# Patient Record
Sex: FEMALE | Race: WHITE | NOT HISPANIC OR LATINO | Employment: FULL TIME | URBAN - METROPOLITAN AREA
[De-identification: names, ages, dates, MRNs, and addresses within clinical notes are randomized per-mention and may not be internally consistent; named-entity substitution may affect disease eponyms.]

---

## 2020-01-30 ENCOUNTER — CLINICAL SUPPORT (OUTPATIENT)
Dept: URGENT CARE | Facility: CLINIC | Age: 40
End: 2020-01-30

## 2020-01-30 PROCEDURE — 86580 TB INTRADERMAL TEST: CPT | Mod: S$GLB,,, | Performed by: EMERGENCY MEDICINE

## 2020-01-30 PROCEDURE — 86580 PR  TB INTRADERMAL TEST: ICD-10-PCS | Mod: S$GLB,,, | Performed by: EMERGENCY MEDICINE

## 2020-12-09 ENCOUNTER — TELEPHONE (OUTPATIENT)
Dept: FAMILY MEDICINE | Facility: CLINIC | Age: 40
End: 2020-12-09

## 2020-12-09 DIAGNOSIS — Z72.89 OTHER PROBLEMS RELATED TO LIFESTYLE: ICD-10-CM

## 2020-12-09 DIAGNOSIS — Z13.9 SCREENING FOR UNSPECIFIED CONDITION: Primary | ICD-10-CM

## 2020-12-09 DIAGNOSIS — Z11.59 NEED FOR HEPATITIS C SCREENING TEST: ICD-10-CM

## 2025-04-10 ENCOUNTER — HOSPITAL ENCOUNTER (EMERGENCY)
Facility: HOSPITAL | Age: 45
Discharge: LEFT BEFORE DISCHARGE | End: 2025-04-10
Attending: EMERGENCY MEDICINE

## 2025-04-10 VITALS
DIASTOLIC BLOOD PRESSURE: 88 MMHG | BODY MASS INDEX: 31.83 KG/M2 | RESPIRATION RATE: 18 BRPM | SYSTOLIC BLOOD PRESSURE: 129 MMHG | HEIGHT: 68 IN | WEIGHT: 210 LBS | TEMPERATURE: 99 F | OXYGEN SATURATION: 98 % | HEART RATE: 85 BPM

## 2025-04-10 DIAGNOSIS — R10.9 ACUTE LEFT FLANK PAIN: ICD-10-CM

## 2025-04-10 DIAGNOSIS — R07.9 CHEST PAIN: ICD-10-CM

## 2025-04-10 DIAGNOSIS — N30.01 ACUTE CYSTITIS WITH HEMATURIA: Primary | ICD-10-CM

## 2025-04-10 LAB
ABSOLUTE EOSINOPHIL (SMH): 0.07 K/UL
ABSOLUTE MONOCYTE (SMH): 0.76 K/UL (ref 0.3–1)
ABSOLUTE NEUTROPHIL COUNT (SMH): 6.2 K/UL (ref 1.8–7.7)
ALBUMIN SERPL-MCNC: 4 G/DL (ref 3.5–5.2)
ALP SERPL-CCNC: 67 UNIT/L (ref 55–135)
ALT SERPL-CCNC: 18 UNIT/L (ref 10–44)
ANION GAP (SMH): 6 MMOL/L (ref 8–16)
AST SERPL-CCNC: 13 UNIT/L (ref 10–40)
B-HCG UR QL: NEGATIVE
BACTERIA #/AREA URNS AUTO: ABNORMAL /HPF
BASOPHILS # BLD AUTO: 0.02 K/UL
BASOPHILS NFR BLD AUTO: 0.2 %
BILIRUB SERPL-MCNC: 0.5 MG/DL (ref 0.1–1)
BILIRUB UR QL STRIP.AUTO: NEGATIVE
BNP SERPL-MCNC: 5 PG/ML
BUN SERPL-MCNC: 16 MG/DL (ref 6–20)
CALCIUM SERPL-MCNC: 9 MG/DL (ref 8.7–10.5)
CHLORIDE SERPL-SCNC: 110 MMOL/L (ref 95–110)
CLARITY UR: ABNORMAL
CO2 SERPL-SCNC: 22 MMOL/L (ref 23–29)
COLOR UR AUTO: YELLOW
CREAT SERPL-MCNC: 1.1 MG/DL (ref 0.5–1.4)
CTP QC/QA: YES
D DIMER PPP IA.FEU-MCNC: 0.37 MG/L FEU
ERYTHROCYTE [DISTWIDTH] IN BLOOD BY AUTOMATED COUNT: 13.1 % (ref 11.5–14.5)
GFR SERPLBLD CREATININE-BSD FMLA CKD-EPI: >60 ML/MIN/1.73/M2
GLUCOSE SERPL-MCNC: 106 MG/DL (ref 70–110)
GLUCOSE UR QL STRIP: NEGATIVE
HCT VFR BLD AUTO: 42.8 % (ref 37–48.5)
HCV AB SERPL QL IA: NORMAL
HGB BLD-MCNC: 15 GM/DL (ref 12–16)
HGB UR QL STRIP: NEGATIVE
HIV 1+2 AB+HIV1 P24 AG SERPL QL IA: NORMAL
IMM GRANULOCYTES # BLD AUTO: 0.08 K/UL (ref 0–0.04)
IMM GRANULOCYTES NFR BLD AUTO: 0.8 % (ref 0–0.5)
KETONES UR QL STRIP: NEGATIVE
LEUKOCYTE ESTERASE UR QL STRIP: ABNORMAL
LYMPHOCYTES # BLD AUTO: 2.89 K/UL (ref 1–4.8)
MAGNESIUM SERPL-MCNC: 1.9 MG/DL (ref 1.6–2.6)
MCH RBC QN AUTO: 32.5 PG (ref 27–31)
MCHC RBC AUTO-ENTMCNC: 35 G/DL (ref 32–36)
MCV RBC AUTO: 93 FL (ref 82–98)
MICROSCOPIC COMMENT: ABNORMAL
NITRITE UR QL STRIP: POSITIVE
NUCLEATED RBC (/100WBC) (SMH): 0 /100 WBC
PH UR STRIP: 7 [PH]
PLATELET # BLD AUTO: 258 K/UL (ref 150–450)
PMV BLD AUTO: 9.8 FL (ref 9.2–12.9)
POTASSIUM SERPL-SCNC: 3.8 MMOL/L (ref 3.5–5.1)
PROT SERPL-MCNC: 6.4 GM/DL (ref 6–8.4)
PROT UR QL STRIP: NEGATIVE
RBC # BLD AUTO: 4.62 M/UL (ref 4–5.4)
RBC #/AREA URNS AUTO: <1 /HPF
RELATIVE EOSINOPHIL (SMH): 0.7 % (ref 0–8)
RELATIVE LYMPHOCYTE (SMH): 28.9 % (ref 18–48)
RELATIVE MONOCYTE (SMH): 7.6 % (ref 4–15)
RELATIVE NEUTROPHIL (SMH): 61.8 % (ref 38–73)
SODIUM SERPL-SCNC: 138 MMOL/L (ref 136–145)
SP GR UR STRIP: 1.02
SQUAMOUS #/AREA URNS AUTO: 4 /HPF
TROPONIN HIGH SENSITIVE (SMH): <2.3 PG/ML
TROPONIN HIGH SENSITIVE (SMH): <2.3 PG/ML
UROBILINOGEN UR STRIP-ACNC: NEGATIVE EU/DL
WBC # BLD AUTO: 10 K/UL (ref 3.9–12.7)
WBC #/AREA URNS AUTO: 11 /HPF

## 2025-04-10 PROCEDURE — 93010 ELECTROCARDIOGRAM REPORT: CPT | Mod: ,,, | Performed by: GENERAL PRACTICE

## 2025-04-10 PROCEDURE — 96375 TX/PRO/DX INJ NEW DRUG ADDON: CPT

## 2025-04-10 PROCEDURE — 83735 ASSAY OF MAGNESIUM: CPT | Performed by: NURSE PRACTITIONER

## 2025-04-10 PROCEDURE — 99285 EMERGENCY DEPT VISIT HI MDM: CPT | Mod: 25

## 2025-04-10 PROCEDURE — 96374 THER/PROPH/DIAG INJ IV PUSH: CPT

## 2025-04-10 PROCEDURE — 81025 URINE PREGNANCY TEST: CPT | Performed by: NURSE PRACTITIONER

## 2025-04-10 PROCEDURE — 84484 ASSAY OF TROPONIN QUANT: CPT | Performed by: NURSE PRACTITIONER

## 2025-04-10 PROCEDURE — 81003 URINALYSIS AUTO W/O SCOPE: CPT | Performed by: NURSE PRACTITIONER

## 2025-04-10 PROCEDURE — 87389 HIV-1 AG W/HIV-1&-2 AB AG IA: CPT | Performed by: EMERGENCY MEDICINE

## 2025-04-10 PROCEDURE — 63600175 PHARM REV CODE 636 W HCPCS: Performed by: EMERGENCY MEDICINE

## 2025-04-10 PROCEDURE — 85379 FIBRIN DEGRADATION QUANT: CPT | Performed by: EMERGENCY MEDICINE

## 2025-04-10 PROCEDURE — 96361 HYDRATE IV INFUSION ADD-ON: CPT

## 2025-04-10 PROCEDURE — 85025 COMPLETE CBC W/AUTO DIFF WBC: CPT | Performed by: NURSE PRACTITIONER

## 2025-04-10 PROCEDURE — 83880 ASSAY OF NATRIURETIC PEPTIDE: CPT | Performed by: NURSE PRACTITIONER

## 2025-04-10 PROCEDURE — 87088 URINE BACTERIA CULTURE: CPT | Performed by: NURSE PRACTITIONER

## 2025-04-10 PROCEDURE — 86803 HEPATITIS C AB TEST: CPT | Performed by: EMERGENCY MEDICINE

## 2025-04-10 PROCEDURE — 93005 ELECTROCARDIOGRAM TRACING: CPT | Performed by: GENERAL PRACTICE

## 2025-04-10 PROCEDURE — 25000003 PHARM REV CODE 250: Performed by: EMERGENCY MEDICINE

## 2025-04-10 PROCEDURE — 84155 ASSAY OF PROTEIN SERUM: CPT | Performed by: NURSE PRACTITIONER

## 2025-04-10 RX ORDER — CEFTRIAXONE 1 G/1
1 INJECTION, POWDER, FOR SOLUTION INTRAMUSCULAR; INTRAVENOUS ONCE
Status: COMPLETED | OUTPATIENT
Start: 2025-04-10 | End: 2025-04-10

## 2025-04-10 RX ORDER — KETOROLAC TROMETHAMINE 30 MG/ML
15 INJECTION, SOLUTION INTRAMUSCULAR; INTRAVENOUS
Status: COMPLETED | OUTPATIENT
Start: 2025-04-10 | End: 2025-04-10

## 2025-04-10 RX ORDER — BUSPIRONE HYDROCHLORIDE 30 MG/1
30 TABLET ORAL 2 TIMES DAILY
COMMUNITY
Start: 2025-03-26

## 2025-04-10 RX ORDER — ROSUVASTATIN CALCIUM 20 MG/1
20 TABLET, COATED ORAL DAILY
COMMUNITY

## 2025-04-10 RX ORDER — SULFAMETHOXAZOLE AND TRIMETHOPRIM 800; 160 MG/1; MG/1
1 TABLET ORAL 2 TIMES DAILY
Qty: 14 TABLET | Refills: 0 | Status: SHIPPED | OUTPATIENT
Start: 2025-04-10 | End: 2025-04-17

## 2025-04-10 RX ORDER — SODIUM CHLORIDE 9 MG/ML
1000 INJECTION, SOLUTION INTRAVENOUS
Status: COMPLETED | OUTPATIENT
Start: 2025-04-10 | End: 2025-04-10

## 2025-04-10 RX ORDER — TOPIRAMATE 25 MG/1
25 TABLET ORAL DAILY
COMMUNITY

## 2025-04-10 RX ORDER — DEXTROAMPHETAMINE SACCHARATE, AMPHETAMINE ASPARTATE MONOHYDRATE, DEXTROAMPHETAMINE SULFATE AND AMPHETAMINE SULFATE 6.25; 6.25; 6.25; 6.25 MG/1; MG/1; MG/1; MG/1
25 CAPSULE, EXTENDED RELEASE ORAL EVERY MORNING
COMMUNITY
Start: 2025-03-26 | End: 2025-04-25

## 2025-04-10 RX ORDER — DEXTROAMPHETAMINE SACCHARATE, AMPHETAMINE ASPARTATE MONOHYDRATE, DEXTROAMPHETAMINE SULFATE AND AMPHETAMINE SULFATE 6.25; 6.25; 6.25; 6.25 MG/1; MG/1; MG/1; MG/1
25 CAPSULE, EXTENDED RELEASE ORAL EVERY MORNING
COMMUNITY
Start: 2025-05-22 | End: 2025-06-21

## 2025-04-10 RX ORDER — BUPROPION HYDROCHLORIDE 300 MG/1
300 TABLET ORAL DAILY
COMMUNITY

## 2025-04-10 RX ADMIN — CEFTRIAXONE 1 G: 1 INJECTION, POWDER, FOR SOLUTION INTRAMUSCULAR; INTRAVENOUS at 04:04

## 2025-04-10 RX ADMIN — KETOROLAC TROMETHAMINE 15 MG: 30 INJECTION, SOLUTION INTRAMUSCULAR; INTRAVENOUS at 04:04

## 2025-04-10 RX ADMIN — SODIUM CHLORIDE 1000 ML: 9 INJECTION, SOLUTION INTRAVENOUS at 04:04

## 2025-04-10 NOTE — ED PROVIDER NOTES
Encounter Date: 4/10/2025       History     Chief Complaint   Patient presents with    Chest Pain     States she began at 3 am she began with chest pressure and feels like something is sitting in her chest.  States she is passing several kidney stones.  States over the last 1 1/2 weeks she is waking up to both hands up to her elbows have been numb.       Ms. Cheng reports pain in L side and L flank. States she feels like she's passing a kidney stone. That is where most of her pain is at this time. She also reports waking up at 3am with chest pain that feels like pressure and burning, states the pain has persisted throughout the day. She also is concerned about several weeks of tingling and numbness in both arms from the elbows down that takes time to wake up with moving and shaking out and massaging her hands. Denies sob, sweating, neck pain, back pain. Reports mild intermittent headaches. Has mild dysuria. Is caring for father who is currently admitted to hospital. Denies vomiting or syncope.         Review of patient's allergies indicates:   Allergen Reactions    Opioids - morphine analogues      Past Medical History:   Diagnosis Date    Asthma     Bell's palsy     Kidney stone      Past Surgical History:   Procedure Laterality Date    APPENDECTOMY      TUBAL LIGATION       No family history on file.  Social History[1]  Review of Systems   Respiratory:  Positive for chest tightness. Negative for cough and shortness of breath.    Cardiovascular:  Positive for chest pain. Negative for palpitations and leg swelling.   Gastrointestinal: Negative.    Genitourinary:  Positive for dysuria and flank pain.        Flank pain on L   Neurological:         Tingling and numbness in both arms intermittently that wakes her up or noticed in am, from elbows down. Denies weakness or other numbness or tingling.    All other systems reviewed and are negative.      Physical Exam     Initial Vitals [04/10/25 1304]   BP Pulse Resp Temp  SpO2   138/84 103 19 98.5 °F (36.9 °C) 99 %      MAP       --         Physical Exam    Nursing note and vitals reviewed.  Constitutional: She appears well-developed and well-nourished. She is not diaphoretic. No distress.   HENT:   Head: Normocephalic and atraumatic.   Neck:   Normal range of motion.  Cardiovascular:  Normal rate, regular rhythm and normal heart sounds.           No murmur heard.  Pulmonary/Chest: Breath sounds normal. No respiratory distress. She has no wheezes. She has no rales.   Abdominal: Abdomen is soft. She exhibits no distension.   Pt examined in CC area so skin not inspected for her privacy.    Musculoskeletal:         General: No tenderness or edema. Normal range of motion.      Cervical back: Normal range of motion.     Neurological: She is alert and oriented to person, place, and time. GCS score is 15. GCS eye subscore is 4. GCS verbal subscore is 5. GCS motor subscore is 6.   Skin: Skin is warm. No rash noted.   Psychiatric: She has a normal mood and affect. Thought content normal.         ED Course   Procedures  Labs Reviewed   CULTURE, URINE - Abnormal       Result Value    Urine Culture   (*)     Value: >100,000 cfu/ml Gram-negative arnaldo - nonfermenter species   COMPREHENSIVE METABOLIC PANEL - Abnormal    Sodium 138      Potassium 3.8      Chloride 110      CO2 22 (*)     Glucose 106      BUN 16      Creatinine 1.1      Calcium 9.0      Protein Total 6.4      Albumin 4.0      Bilirubin Total 0.5      ALP 67      AST 13      ALT 18      Anion Gap 6 (*)     eGFR >60     URINALYSIS, REFLEX TO URINE CULTURE - Abnormal    Color, UA Yellow      Appearance, UA Hazy (*)     Spec Grav UA 1.020      pH, UA 7.0      Protein, UA Negative      Glucose, UA Negative      Ketones, UA Negative      Blood, UA Negative      Bilirubin, UA Negative      Urobilinogen, UA Negative      Nitrites, UA Positive (*)     Leukocyte Esterase, UA 1+ (*)    CBC WITH DIFFERENTIAL - Abnormal    WBC 10.00      RBC 4.62       Hgb 15.0      Hct 42.8      MCV 93      MCH 32.5 (*)     MCHC 35.0      RDW 13.1      Platelet Count 258      MPV 9.8      Nucleated RBC 0      Neut % 61.8      Lymph % 28.9      Mono % 7.6      Eos % 0.7      Basophil % 0.2      Imm Grans % 0.8 (*)     Neut # 6.2      Lymph # 2.89      Mono # 0.76      Eos # 0.07      Baso # 0.02      Imm Grans # 0.08 (*)    URINALYSIS MICROSCOPIC - Abnormal    RBC, UA <1      WBC, UA 11 (*)     Bacteria, UA Moderate (*)     Squamous Epithelial Cells, UA 4      Microscopic Comment       HEPATITIS C ANTIBODY - Normal    Hep C Ab Interp Non-Reactive     HIV 1 / 2 ANTIBODY - Normal    HIV 1/2 Ag/Ab Non-Reactive     MAGNESIUM - Normal    Magnesium 1.9     TROPONIN I HIGH SENSITIVITY - Normal    Troponin High Sensitive <2.3     TROPONIN I HIGH SENSITIVITY - Normal    Troponin High Sensitive <2.3     B-TYPE NATRIURETIC PEPTIDE - Normal    BNP 5     D DIMER, QUANTITATIVE - Normal    D-Dimer 0.37     CBC W/ AUTO DIFFERENTIAL    Narrative:     The following orders were created for panel order CBC auto differential.  Procedure                               Abnormality         Status                     ---------                               -----------         ------                     CBC with Differential[[id number]]       Abnormal            Final result                 Please view results for these tests on the individual orders.   POCT URINE PREGNANCY    POC Preg Test, Ur Negative       Acceptable Yes       EKG Readings: (Independently Interpreted)   Initial Reading: No STEMI. Rhythm: Sinus Tachycardia. Ectopy: No Ectopy. Conduction: Normal.   No prior for comparison     ECG Results              EKG 12-lead (In process)        Collection Time Result Time QRS Duration OHS QTC Calculation    04/10/25 12:58:56 04/10/25 13:20:29 82 456                     In process by Interface, Lab In [facility] (04/10/25 13:20:33)                   Narrative:    Test Reason :  R07.9,    Vent. Rate : 102 BPM     Atrial Rate : 102 BPM     P-R Int : 162 ms          QRS Dur :  82 ms      QT Int : 350 ms       P-R-T Axes :  68  75  65 degrees    QTcB Int : 456 ms    Sinus tachycardia  Otherwise normal ECG  No previous ECGs available    Referred By: AAAREFERRAL SELF           Confirmed By:                                   Imaging Results              CT Abdomen Pelvis  Without Contrast (Final result)  Result time 04/10/25 19:40:37      Final result by Anthony Avelar MD (04/10/25 19:40:37)                   Impression:      No acute intra-abdominal process.    Mild atelectasis left upper lobe.      Electronically signed by: Anthony Avelar  Date:    04/10/2025  Time:    19:40               Narrative:    EXAMINATION:  CT ABDOMEN PELVIS WITHOUT CONTRAST    CLINICAL HISTORY:  Flank pain, kidney stone suspected;L flank and LLQ, nitrite pos urine;    TECHNIQUE:  Low dose axial images, sagittal and coronal reformations were obtained from the lung bases to the pubic symphysis.  Oral contrast was not administered.    COMPARISON:  10/11/2015    FINDINGS:  Soft tissues: Unremarkable.    Bones: No acute osseous abnormality.    Lower chest: Normal heart size. No pericardial effusion.    Lung Bases: Mild atelectasis left upper lobe.    Liver: Normal in size and attenuation, with no focal hepatic lesions.    Gallbladder: No calcified gallstones.    Bile Ducts: No evidence of dilated ducts.    Pancreas: No mass or peripancreatic fat stranding.    Spleen: Unremarkable.    Adrenals: Unremarkable.    Kidneys/ Ureters: Nonobstructive bilateral nephrolithiasis.  Left renal scarring.    Bladder: No evidence of wall thickening.    Reproductive organs: Fibroids.    GI Tract/Mesentery: No evidence of bowel obstruction or inflammation.    Peritoneal Space: No ascites. No free air.    Lymphadenopathy: No significant adenopathy.    Vasculature: No significant atherosclerosis or aneurysm.                                        X-Ray Chest PA And Lateral (Final result)  Result time 04/10/25 14:40:28      Final result by Cecilia Sorenson MD (04/10/25 14:40:28)                   Impression:      Normal chest.      Electronically signed by: Cecilia Sorenson  Date:    04/10/2025  Time:    14:40               Narrative:    EXAMINATION:  XR CHEST PA AND LATERAL    CLINICAL HISTORY:  Chest Pain;    FINDINGS:  PA and lateral chest without comparisons shows normal cardiomediastinal silhouette.    Lungs are clear. Pulmonary vasculature is normal. No acute osseous abnormality.                                       Medications   0.9% NaCl infusion (0 mLs Intravenous Stopped 4/10/25 3227)   cefTRIAXone injection 1 g (1 g Intravenous Given 4/10/25 1640)   ketorolac injection 15 mg (15 mg Intravenous Given 4/10/25 1640)     Medical Decision Making  43 yo with several complaints including chest pain, dysuria, flank pain, tingling/numbness in arms for several weeks.   Ddx is broad and includes but is not limited to cardiac, urologic, neurologic, metabolic, electrolyte, infectious, inflammatory.   Pt given iv fluids. UA noted, uti, rocephin given.   Ddimer negative. Labs noted, cbc, cmp, trop reassuring.   CT abd/pelvis ordered due to reported hx of renal stones and sxs of active stones per pt.     I was called to RWR space, pt reports she wants to leave, states she can't wait for results. Family present, pt doesn't want to stay any longer. I specifically advised that work up isn't complete and cT isn't resulted, she would need to leave against medical advise and she understands risks. Will give rx for uti to continue care of one known issue, etiology of other complaints not fully elucidated before pt left ED.     Amount and/or Complexity of Data Reviewed  Radiology: ordered.    Risk  Prescription drug management.      Additional MDM:   Heart Score:    History:          Slightly suspicious.  ECG:             Normal  Age:               Less  than 45 years  Risk factors: >= 3 risk factors or history of atherosclerotic disease  Troponin:       Less than or equal to normal limit  Heart Score = 2                                       Clinical Impression:  Final diagnoses:  [R07.9] Chest pain  [N30.01] Acute cystitis with hematuria (Primary)  [R10.9] Acute left flank pain          ED Disposition Condition    AMA Stable                    [1]   Social History  Tobacco Use    Smoking status: Every Day     Current packs/day: 0.50     Types: Cigarettes   Substance Use Topics    Alcohol use: Yes     Comment: occ    Drug use: No        Rere Hough MD  04/11/25 [id number]

## 2025-04-10 NOTE — FIRST PROVIDER EVALUATION
Emergency Department TeleTriage Encounter Note      CHIEF COMPLAINT    Chief Complaint   Patient presents with    Chest Pain     States she began at 3 am she began with chest pressure and feels like something is sitting in her chest.  States she is passing several kidney stones.  States over the last 1 1/2 weeks she is waking up to both hands up to her elbows have been numb.         VITAL SIGNS   Initial Vitals [04/10/25 1304]   BP Pulse Resp Temp SpO2   138/84 103 19 98.5 °F (36.9 °C) 99 %      MAP       --            ALLERGIES    Review of patient's allergies indicates:   Allergen Reactions    Opioids - morphine analogues        PROVIDER TRIAGE NOTE  Verbal consent for the teletriage evaluation was given by the patient at the start of the evaluation.  All efforts will be made to maintain patient's privacy during the evaluation.      This is a teletriage evaluation of a 44 y.o. female presenting to the ED with c/o CP and lower abd pain that started last night.  Also thinks passing kidney stones. Limited physical exam via telehealth: The patient is awake, alert, answering questions appropriately and is not in respiratory distress.  As the Teletriage provider, I performed an initial assessment and ordered appropriate labs and imaging studies, if any, to facilitate the patient's care once placed in the ED. Once a room is available, care and a full evaluation will be completed by an alternate ED provider.  Any additional orders and the final disposition will be determined by that provider.  All imaging and labs will not be followed-up by the Teletriage Team, including myself.          ORDERS  Labs Reviewed   HEPATITIS C ANTIBODY   HIV 1 / 2 ANTIBODY       ED Orders (720h ago, onward)      Start Ordered     Status Ordering Provider    04/10/25 1544 04/10/25 1343  Troponin I High Sensitivity #2  Once Timed         Ordered KEYSHAWN AKERS    04/10/25 1344 04/10/25 1343  Magnesium  STAT         Ordered KEYSHAWN AKERS     04/10/25 1344 04/10/25 1343  Vital signs  Every 15 min         Ordered KEYSHAWN AKERS    04/10/25 1344 04/10/25 1343  Cardiac Monitoring - Adult  Continuous        Comments: Notify Physician If:    Ordered KEYSHAWN AKERS    04/10/25 1344 04/10/25 1343  Pulse Oximetry Continuous  Continuous         Ordered KEYSHAWN AKERS    04/10/25 1344 04/10/25 1343  Diet NPO  Diet effective now         Ordered KEYSHAWN AKERS    04/10/25 1344 04/10/25 1343  Saline lock IV  Once         Ordered KEYSHAWN AKERS    04/10/25 1344 04/10/25 1343  EKG 12-lead  Once        Comments: Do not perform if previously done during this visit/ triage    Ordered KEYSHAWN AKERS    04/10/25 1344 04/10/25 1343  CBC auto differential  STAT         Ordered KEYSHAWN AKERS    04/10/25 1344 04/10/25 1343  Comprehensive metabolic panel  STAT         Ordered KEYSHAWN AKERS    04/10/25 1344 04/10/25 1343  Troponin I High Sensitivity #1  STAT         Ordered KEYSHAWN AKERS    04/10/25 1344 04/10/25 1343  B-Type natriuretic peptide (BNP)  STAT         Ordered KEYSHAWN AKERS    04/10/25 1344 04/10/25 1343  X-Ray Chest PA And Lateral  1 time imaging         Ordered KEYSHAWN AKERS    04/10/25 1344 04/10/25 1343  POCT urine pregnancy  Once         Ordered KEYSHAWN AKERS    04/10/25 1344 04/10/25 1343  Urinalysis, Reflex to Urine Culture  STAT         Ordered KEYSHAWN AKERS    04/10/25 1307 04/10/25 1307  Hepatitis C Antibody  STAT         Ordered MARLEN NIELSEN    04/10/25 1307 04/10/25 1307  HIV 1/2 Ag/Ab (4th Gen)  STAT         Ordered MARLEN NIELSEN    04/10/25 1302 04/10/25 1301  EKG 12-lead  Once         In process HU MERCADO              Virtual Visit Note: The provider triage portion of this emergency department evaluation and documentation was performed via [employer], a HIPAA-compliant telemedicine application, in concert with a tele-presenter in the room. A face to face patient evaluation with one of my colleagues will occur once the patient is placed in an  emergency department room.      DISCLAIMER: This note was prepared with [employer] voice recognition transcription software. Garbled syntax, mangled pronouns, and other bizarre constructions may be attributed to that software system.

## 2025-04-12 ENCOUNTER — RESULTS FOLLOW-UP (OUTPATIENT)
Dept: EMERGENCY MEDICINE | Facility: HOSPITAL | Age: 45
End: 2025-04-12

## 2025-04-12 LAB — BACTERIA UR CULT: ABNORMAL

## 2025-04-13 LAB
OHS QRS DURATION: 82 MS
OHS QTC CALCULATION: 456 MS